# Patient Record
Sex: MALE | Employment: UNEMPLOYED | URBAN - METROPOLITAN AREA
[De-identification: names, ages, dates, MRNs, and addresses within clinical notes are randomized per-mention and may not be internally consistent; named-entity substitution may affect disease eponyms.]

---

## 2022-03-11 ENCOUNTER — HOSPITAL ENCOUNTER (OUTPATIENT)
Dept: CT IMAGING | Age: 87
Discharge: HOME | End: 2022-03-11
Attending: UROLOGY
Payer: MEDICARE

## 2022-03-11 DIAGNOSIS — C61 PROSTATE CANCER (HCC): ICD-10-CM

## 2022-03-11 DIAGNOSIS — R31.0 GROSS HEMATURIA: ICD-10-CM

## 2022-03-11 LAB — CREAT UR-MCNC: 0.7 MG/DL (ref 0.6–1.3)

## 2022-03-11 PROCEDURE — 82565 ASSAY OF CREATININE: CPT

## 2022-03-11 PROCEDURE — 74178 CT ABD&PLV WO CNTR FLWD CNTR: CPT

## 2022-03-11 PROCEDURE — 74011000636 HC RX REV CODE- 636: Performed by: UROLOGY

## 2022-03-11 RX ADMIN — IOPAMIDOL 100 ML: 755 INJECTION, SOLUTION INTRAVENOUS at 15:33

## 2022-03-16 NOTE — PROGRESS NOTES
We sent him to ED to get CT chest and evaluate for PE. He is still needing to see us for Cysto. Should be seeing Dr. Vee Keith I believe.

## 2022-07-09 PROBLEM — I26.99 PULMONARY EMBOLUS (HCC): Status: ACTIVE | Noted: 2022-03-17
